# Patient Record
Sex: FEMALE | Race: WHITE | NOT HISPANIC OR LATINO | Employment: FULL TIME | URBAN - METROPOLITAN AREA
[De-identification: names, ages, dates, MRNs, and addresses within clinical notes are randomized per-mention and may not be internally consistent; named-entity substitution may affect disease eponyms.]

---

## 2017-01-20 ENCOUNTER — OFFICE VISIT (OUTPATIENT)
Dept: URGENT CARE | Facility: PHYSICIAN GROUP | Age: 57
End: 2017-01-20
Payer: COMMERCIAL

## 2017-01-20 VITALS
HEART RATE: 90 BPM | BODY MASS INDEX: 43.98 KG/M2 | DIASTOLIC BLOOD PRESSURE: 88 MMHG | OXYGEN SATURATION: 97 % | SYSTOLIC BLOOD PRESSURE: 132 MMHG | TEMPERATURE: 98.4 F | WEIGHT: 239 LBS | HEIGHT: 62 IN

## 2017-01-20 DIAGNOSIS — M54.50 ACUTE LEFT-SIDED LOW BACK PAIN WITHOUT SCIATICA: ICD-10-CM

## 2017-01-20 PROCEDURE — 99214 OFFICE O/P EST MOD 30 MIN: CPT | Performed by: FAMILY MEDICINE

## 2017-01-20 RX ORDER — NAPROXEN 500 MG/1
500 TABLET ORAL 2 TIMES DAILY WITH MEALS
Qty: 40 TAB | Refills: 0 | Status: SHIPPED | OUTPATIENT
Start: 2017-01-20

## 2017-01-20 RX ORDER — CYCLOBENZAPRINE HCL 10 MG
10 TABLET ORAL 2 TIMES DAILY PRN
Qty: 14 TAB | Refills: 0 | Status: SHIPPED | OUTPATIENT
Start: 2017-01-20

## 2017-01-20 ASSESSMENT — ENCOUNTER SYMPTOMS
NUMBNESS: 1
TINGLING: 1
DIZZINESS: 0
FEVER: 0
NAUSEA: 0
BOWEL INCONTINENCE: 0
HEARTBURN: 0
VOMITING: 0
SHORTNESS OF BREATH: 0
BACK PAIN: 1

## 2017-01-20 NOTE — MR AVS SNAPSHOT
"Daiana Palmer   2017 9:10 AM   Office Visit   MRN: [id number]    Department:  [facility]   Dept Phone:  [phone]    Description:  Female : 1960   Provider:  Rambo Kiser M.D.           Reason for Visit     Back Pain Lower back pain/mid back pain x6 days       Allergies as of 2017     Allergen Noted Reactions    Codeine 2010   Nausea      You were diagnosed with     Acute left-sided low back pain without sciatica   [[id number]]         Vital Signs     Blood Pressure Pulse Temperature Height Weight Body Mass Index    132/88 mmHg 90 36.9 °C (98.4 °F) 1.575 m (5' 2.01\") 108.41 kg (239 lb) 43.70 kg/m2    Oxygen Saturation                   97%           Basic Information     Date Of Birth Sex Race Ethnicity Preferred Language    1960 Female White Non- English      Problem List              ICD-10-CM Priority Class Noted - Resolved    Visit for screening mammogram Z12.31   10/28/2010 - Present    Allergic state T78.40XA   Unknown - Present    Arthritis M19.90   Unknown - Present    Menopausal and postmenopausal disorder N95.9   2006 - Present    Plantar fascia syndrome M72.2   Unknown - Present    Hyperlipidemia E78.5   Unknown - Present    Shingles B02.9   Unknown - Present    Vitamin d deficiency    2010 - Present      Health Maintenance        Date Due Completion Dates    IMM DTaP/Tdap/Td Vaccine (1 - Tdap) 1977 ---    MAMMOGRAM 1998 ---    COLONOSCOPY 2008 ---    PAP SMEAR 2013    IMM INFLUENZA (1) 2016 ---            Current Immunizations     No immunizations on file.      Below and/or attached are the medications your provider expects you to take. Review all of your home medications and newly ordered medications with your provider and/or pharmacist. Follow medication instructions as directed by your provider and/or pharmacist. Please keep your medication list with you and share with your provider. Update the information " when medications are discontinued, doses are changed, or new medications (including over-the-counter products) are added; and carry medication information at all times in the event of emergency situations     Allergies:  CODEINE - Nausea               Medications  Valid as of: January 20, 2017 -  9:46 AM    Generic Name Brand Name Tablet Size Instructions for use    Azithromycin (Tab) ZITHROMAX 250 MG uad        Cyclobenzaprine HCl (Tab) FLEXERIL 10 MG Take 1 Tab by mouth 2 times a day as needed. May cause drowsiness (do not operate heavy machinery).        Ibuprofen   600 mg by Does not apply route 3 times a day. Indications: Soft Tissue Inflammation in the Sole of the Foot        Naproxen (Tab) NAPROSYN 500 MG Take 1 Tab by mouth 2 times a day, with meals. Daily for 5 days, then when necessary for pain        .                 Medicines prescribed today were sent to:     [id number] - [place], [place] - [address]    [address] [place] [address]    Phone: [phone] Fax: [phone]    Open 24 Hours?: No      Medication refill instructions:       If your prescription bottle indicates you have medication refills left, it is not necessary to call your provider’s office. Please contact your pharmacy and they will refill your medication.    If your prescription bottle indicates you do not have any refills left, you may request refills at any time through one of the following ways: The online Netotiate system (except Urgent Care), by calling your provider’s office, or by asking your pharmacy to contact your provider’s office with a refill request. Medication refills are processed only during regular business hours and may not be available until the next business day. Your provider may request additional information or to have a follow-up visit with you prior to refilling your medication.   *Please Note: Medication refills are assigned a new Rx number when refilled electronically. Your pharmacy  may indicate that no refills were authorized even though a new prescription for the same medication is available at the pharmacy. Please request the medicine by name with the pharmacy before contacting your provider for a refill.        Instructions    Back Pain, Adult  Back pain is very common in adults. The cause of back pain is rarely dangerous and the pain often gets better over time. The cause of your back pain may not be known. Some common causes of back pain include:  · Strain of the muscles or ligaments supporting the spine.  · Wear and tear (degeneration) of the spinal disks.  · Arthritis.  · Direct injury to the back.  For many people, back pain may return. Since back pain is rarely dangerous, most people can learn to manage this condition on their own.  HOME CARE INSTRUCTIONS  Watch your back pain for any changes. The following actions may help to lessen any discomfort you are feeling:  · Remain active. It is stressful on your back to sit or  one place for long periods of time. Do not sit, drive, or  one place for more than 30 minutes at a time. Take short walks on even surfaces as soon as you are able. Try to increase the length of time you walk each day.  · Exercise regularly as directed by your health care provider. Exercise helps your back heal faster. It also helps avoid future injury by keeping your muscles strong and flexible.  · Do not stay in bed. Resting more than 1-2 days can delay your recovery.  · Pay attention to your body when you bend and lift. The most comfortable positions are those that put less stress on your recovering back. Always use proper lifting techniques, including:  ¨ Bending your knees.  ¨ Keeping the load close to your body.  ¨ Avoiding twisting.  · Find a comfortable position to sleep. Use a firm mattress and lie on your side with your knees slightly bent. If you lie on your back, put a pillow under your knees.  · Avoid feeling anxious or stressed. Stress  increases muscle tension and can worsen back pain. It is important to recognize when you are anxious or stressed and learn ways to manage it, such as with exercise.  · Take medicines only as directed by your health care provider. Over-the-counter medicines to reduce pain and inflammation are often the most helpful. Your health care provider may prescribe muscle relaxant drugs. These medicines help dull your pain so you can more quickly return to your normal activities and healthy exercise.  · Apply ice to the injured area:  ¨ Put ice in a plastic bag.  ¨ Place a towel between your skin and the bag.  ¨ Leave the ice on for 20 minutes, 2-3 times a day for the first 2-3 days. After that, ice and heat may be alternated to reduce pain and spasms.  · Maintain a healthy weight. Excess weight puts extra stress on your back and makes it difficult to maintain good posture.  SEEK MEDICAL CARE IF:  · You have pain that is not relieved with rest or medicine.  · You have increasing pain going down into the legs or buttocks.  · You have pain that does not improve in one week.  · You have night pain.  · You lose weight.  · You have a fever or chills.  SEEK IMMEDIATE MEDICAL CARE IF:   · You develop new bowel or bladder control problems.  · You have unusual weakness or numbness in your arms or legs.  · You develop nausea or vomiting.  · You develop abdominal pain.  · You feel faint.     This information is not intended to replace advice given to you by your health care provider. Make sure you discuss any questions you have with your health care provider.     Document Released: 12/18/2006 Document Revised: 01/08/2016 Document Reviewed: 04/21/2015  [employer] Interactive Patient Education ©2016 [employer]      Lumbosacral Strain  Lumbosacral strain is a strain of any of the parts that make up your lumbosacral vertebrae. Your lumbosacral vertebrae are the bones that make up the lower third of your backbone. Your lumbosacral vertebrae  are held together by muscles and tough, fibrous tissue (ligaments).   CAUSES   A sudden blow to your back can cause lumbosacral strain. Also, anything that causes an excessive stretch of the muscles in the low back can cause this strain. This is typically seen when people exert themselves strenuously, fall, lift heavy objects, bend, or crouch repeatedly.  RISK FACTORS  · Physically demanding work.  · Participation in pushing or pulling sports or sports that require a sudden twist of the back (tennis, golf, baseball).  · Weight lifting.  · Excessive lower back curvature.  · Forward-tilted pelvis.  · Weak back or abdominal muscles or both.  · Tight hamstrings.  SIGNS AND SYMPTOMS   Lumbosacral strain may cause pain in the area of your injury or pain that moves (radiates) down your leg.   DIAGNOSIS  Your health care provider can often diagnose lumbosacral strain through a physical exam. In some cases, you may need tests such as X-ray exams.   TREATMENT   Treatment for your lower back injury depends on many factors that your clinician will have to evaluate. However, most treatment will include the use of anti-inflammatory medicines.  HOME CARE INSTRUCTIONS   · Avoid hard physical activities (tennis, racquetball, waterskiing) if you are not in proper physical condition for it. This may aggravate or create problems.  · If you have a back problem, avoid sports requiring sudden body movements. Swimming and walking are generally safer activities.  · Maintain good posture.  · Maintain a healthy weight.  · For acute conditions, you may put ice on the injured area.  ¨ Put ice in a plastic bag.  ¨ Place a towel between your skin and the bag.  ¨ Leave the ice on for 20 minutes, 2-3 times a day.  · When the low back starts healing, stretching and strengthening exercises may be recommended.  SEEK MEDICAL CARE IF:  · Your back pain is getting worse.  · You experience severe back pain not relieved with medicines.  SEEK IMMEDIATE  MEDICAL CARE IF:   · You have numbness, tingling, weakness, or problems with the use of your arms or legs.  · There is a change in bowel or bladder control.  · You have increasing pain in any area of the body, including your belly (abdomen).  · You notice shortness of breath, dizziness, or feel faint.  · You feel sick to your stomach (nauseous), are throwing up (vomiting), or become sweaty.  · You notice discoloration of your toes or legs, or your feet get very cold.  MAKE SURE YOU:   · Understand these instructions.  · Will watch your condition.  · Will get help right away if you are not doing well or get worse.     This information is not intended to replace advice given to you by your health care provider. Make sure you discuss any questions you have with your health care provider.     Document Released: 09/27/2006 Document Revised: 01/08/2016 Document Reviewed: 08/06/2014  [employer] Interactive Patient Education ©2016 [employer].    PILATES DVD from amazon[employer]makerist. Beginner with high ratings        BASIC EXERCISES FOR THE LOW BACK  Perform these exercises slowly, without forcing movement. Be sure to breathe throughout the exercises. You should  feel a slight stretch, however, do not move into pain. Your symptoms should not intensify as a result of doing your  exercises. Perform the exercises 2-3 times daily.  Hamstrings (fig.1)  Lying on floor, pull thigh towards your chest to about 90 .  Straighten your knee until a stretch is felt in back of thigh.  Hold 1 minute. Repeat with opposite leg.    Single Knee to Chest (fig.2)  Pull knee in to chest until a comfortable stretch is felt in hip  and lower back. Hold 15 seconds. Repeat with opposite leg.  Repeat 5-10 times each leg.  Pelvic Tilt (fig.3)  Flatten back by tightening stomach and buttock muscles.  Hold 10 seconds. Repeat 10 times.  Cat and Camel (fig.4)  On all fours, assume a “hump” back position by arching the  back up. Hold briefly and then slowly lower the  back into a  sagging position. Repeat 10-15 times.  Hip Flexors(fig.5)  Lying on you back, pull one knee to the chest to keep the  back flat. Allow the opposite thigh to drop over the edge of  the bed. Do not allow the thigh to move away from the  midline or rotate. Hold 30 seconds. Repeat 2 times each leg.    Prop Up on Elbows (fig.6)    On firm surface, lying on your stomach, prop up on your  elbows. Keep pelvis, hips and legs relaxed. If propping on  elbows is painful, try only lying on stomach or with a pillow  under your abdomen. Hold 30 seconds. Repeat 3-5 times.  Tail Wag (fig.7)  On all fours with back maintained in neutral position, gently  move hips toward rib cage to side bend trunk. Hold briefly,  then alternate and do other side. Repeat 10-15 times.  Lumbar Rotation (fig.8)  Slowly rock knees from side to side in a pain free range of  motion. Allow back to rotate slightly. Repeat 10-15 times.            [employer] Access Code: [email or web address]  Expires: 1/27/2017  2:57 PM    [employer]  A secure, online tool to manage your health information     [employer]’s [employer]® is a secure, online tool that connects you to your personalized health information from the privacy of your home -- day or night - making it very easy for you to manage your healthcare. Once the activation process is completed, you can even access your medical information using the [employer] alex, which is available for free in the Apple Alex store or Google Play store.     [employer] provides the following levels of access (as shown below):   My Chart Features   [facility] Doctor [facility]  Urgent  Care Non-[facility]  Primary Care  Doctor   Email your healthcare team securely and privately 24/7 X X X    Manage appointments: schedule your next appointment; view details of past/upcoming appointments X      Request prescription refills. X      View recent personal medical records, including lab and immunizations X X X X   View  health record, including health history, allergies, medications X X X X   Read reports about your outpatient visits, procedures, consult and ER notes X X X X   See your discharge summary, which is a recap of your hospital and/or ER visit that includes your diagnosis, lab results, and care plan. X X       How to register for [employer]:  1. Go to  [email or web address].  2. Click on the Sign Up Now box, which takes you to the New Member Sign Up page. You will need to provide the following information:  a. Enter your [employer] Access Code exactly as it appears at the top of this page. (You will not need to use this code after you’ve completed the sign-up process. If you do not sign up before the expiration date, you must request a new code.)   b. Enter your date of birth.   c. Enter your home email address.   d. Click Submit, and follow the next screen’s instructions.  3. Create a [employer] Software ID. This will be your [employer] login ID and cannot be changed, so think of one that is secure and easy to remember.  4. Create a Jumbletst password. You can change your password at any time.  5. Enter your Password Reset Question and Answer. This can be used at a later time if you forget your password.   6. Enter your e-mail address. This allows you to receive e-mail notifications when new information is available in [employer].  7. Click Sign Up. You can now view your health information.    For assistance activating your [employer] account, call [phone]

## 2017-01-20 NOTE — PROGRESS NOTES
Subjective:      Daiana Palmer is a 57 y.o. female who presents with Back Pain            Back Pain  This is a new problem. The current episode started in the past 7 days (5 days). The problem has been gradually worsening since onset. The pain is present in the lumbar spine (left lumbar). The quality of the pain is described as aching. The pain radiates to the left thigh (Left radicular pain to the knee, numbness and tingling). The pain is at a severity of 5/10. The pain is moderate. The pain is worse during the night. The symptoms are aggravated by position. Associated symptoms include numbness and tingling. Pertinent negatives include no bladder incontinence, bowel incontinence, chest pain or fever. Risk factors: extensinve sitting and lifting to help mom move. She has tried NSAIDs for the symptoms. The treatment provided mild relief.       Review of Systems   Constitutional: Negative for fever.   Respiratory: Negative for shortness of breath.    Cardiovascular: Negative for chest pain.   Gastrointestinal: Negative for heartburn, nausea, vomiting and bowel incontinence.   Genitourinary: Negative for bladder incontinence.   Musculoskeletal: Positive for back pain.   Neurological: Positive for tingling and numbness. Negative for dizziness.     PMH:  has a past medical history of Menopausal and postmenopausal disorder (12/2006); Urinary tract infection, site not specified; Plantar fascia syndrome (2010); Allergy; Arthritis; Hyperlipidemia; and Shingles (8/2009). She also has no past medical history of Addisons disease (CMS-HCC), Adrenal disorder (CMS-HCC), Cushings syndrome (CMS-HCC), Parathyroid disorder (CMS-HCC), Pituitary disease (CMS-HCC), GERD (gastroesophageal reflux disease), Anemia, Blood transfusion, Anxiety, Depression, Arrhythmia, Heart murmur, Heart attack (CMS-HCC), CHF (congestive heart failure) (CMS-HCC), Clotting disorder (CMS-HCC), ASTHMA, COPD, Diabetes, Cancer (CMS-HCC), CATARACT, Glaucoma,  "Goiter, EMPHYSEMA, Headache(784.0), Migraine, HIV (human immunodeficiency virus infection), Hypertension, IBD (inflammatory bowel disease), Kidney disease, Meningitis, Muscle disorder, OSTEOPOROSIS, Seizure (CMS-[facility]), Stroke (CMS-[facility]), Substance abuse, Thyroid disease, Tuberculosis, or Ulcer (CMS-[facility]).  MEDS:   Current outpatient prescriptions:   •  azithromycin (ZITHROMAX) 250 MG Tab, uad, Disp: 6 Tab, Rfl: 0  •  IBUPROFEN, 600 mg by Does not apply route 3 times a day. Indications: Soft Tissue Inflammation in the Sole of the Foot, Disp: , Rfl:   ALLERGIES:   Allergies   Allergen Reactions   • Codeine Nausea     SURGHX:   Past Surgical History   Procedure Laterality Date   • Hernia repair       Baby     SOCHX:  reports that she has quit smoking. Her smoking use included Cigarettes. She has a 5 pack-year smoking history. She does not have any smokeless tobacco history on file. She reports that she drinks about 7.0 oz of alcohol per week. She reports that she does not use illicit drugs.  FH: Family history was reviewed, no pertinent findings to report      Objective:     /88 mmHg  Pulse 90  Temp(Src) 36.9 °C (98.4 °F)  Ht 1.575 m (5' 2.01\")  Wt 108.41 kg (239 lb)  BMI 43.70 kg/m2  SpO2 97%     Physical Exam   Constitutional: She appears well-developed. No distress.   Pulmonary/Chest: Effort normal.   Skin: Skin is warm and dry. She is not diaphoretic. No erythema.   Psychiatric: She has a normal mood and affect. Her behavior is normal.         Lumbar spine exam:  No acute distress  Able to walk on heels and toes  Able to flex to 90° with some discomfort  Extension and lateral rotation with some discomfort  Strength testing with hip flexion, knee flexion and extension, ankle dorsiflexion and plantarflexion, and EHL testing were 5 out of 5 bilaterally  Sensation was intact bilaterally  The legs were otherwise neurovascularly intact     Assessment/Plan:     1. Acute left-sided low back pain without sciatica  " naproxen (NAPROSYN) 500 MG Tab    cyclobenzaprine (FLEXERIL) 10 MG Tab       Beginner Pilates upon recovery  gentle stretching  Follow-up if symptoms worsen or fail to improve

## 2017-01-20 NOTE — PATIENT INSTRUCTIONS
Back Pain, Adult  Back pain is very common in adults. The cause of back pain is rarely dangerous and the pain often gets better over time. The cause of your back pain may not be known. Some common causes of back pain include:  · Strain of the muscles or ligaments supporting the spine.  · Wear and tear (degeneration) of the spinal disks.  · Arthritis.  · Direct injury to the back.  For many people, back pain may return. Since back pain is rarely dangerous, most people can learn to manage this condition on their own.  HOME CARE INSTRUCTIONS  Watch your back pain for any changes. The following actions may help to lessen any discomfort you are feeling:  · Remain active. It is stressful on your back to sit or  one place for long periods of time. Do not sit, drive, or  one place for more than 30 minutes at a time. Take short walks on even surfaces as soon as you are able. Try to increase the length of time you walk each day.  · Exercise regularly as directed by your health care provider. Exercise helps your back heal faster. It also helps avoid future injury by keeping your muscles strong and flexible.  · Do not stay in bed. Resting more than 1-2 days can delay your recovery.  · Pay attention to your body when you bend and lift. The most comfortable positions are those that put less stress on your recovering back. Always use proper lifting techniques, including:  ¨ Bending your knees.  ¨ Keeping the load close to your body.  ¨ Avoiding twisting.  · Find a comfortable position to sleep. Use a firm mattress and lie on your side with your knees slightly bent. If you lie on your back, put a pillow under your knees.  · Avoid feeling anxious or stressed. Stress increases muscle tension and can worsen back pain. It is important to recognize when you are anxious or stressed and learn ways to manage it, such as with exercise.  · Take medicines only as directed by your health care provider. Over-the-counter  medicines to reduce pain and inflammation are often the most helpful. Your health care provider may prescribe muscle relaxant drugs. These medicines help dull your pain so you can more quickly return to your normal activities and healthy exercise.  · Apply ice to the injured area:  ¨ Put ice in a plastic bag.  ¨ Place a towel between your skin and the bag.  ¨ Leave the ice on for 20 minutes, 2-3 times a day for the first 2-3 days. After that, ice and heat may be alternated to reduce pain and spasms.  · Maintain a healthy weight. Excess weight puts extra stress on your back and makes it difficult to maintain good posture.  SEEK MEDICAL CARE IF:  · You have pain that is not relieved with rest or medicine.  · You have increasing pain going down into the legs or buttocks.  · You have pain that does not improve in one week.  · You have night pain.  · You lose weight.  · You have a fever or chills.  SEEK IMMEDIATE MEDICAL CARE IF:   · You develop new bowel or bladder control problems.  · You have unusual weakness or numbness in your arms or legs.  · You develop nausea or vomiting.  · You develop abdominal pain.  · You feel faint.     This information is not intended to replace advice given to you by your health care provider. Make sure you discuss any questions you have with your health care provider.     Document Released: 12/18/2006 Document Revised: 01/08/2016 Document Reviewed: 04/21/2015  [employer] Interactive Patient Education ©2016 [employer]      Lumbosacral Strain  Lumbosacral strain is a strain of any of the parts that make up your lumbosacral vertebrae. Your lumbosacral vertebrae are the bones that make up the lower third of your backbone. Your lumbosacral vertebrae are held together by muscles and tough, fibrous tissue (ligaments).   CAUSES   A sudden blow to your back can cause lumbosacral strain. Also, anything that causes an excessive stretch of the muscles in the low back can cause this strain. This is  typically seen when people exert themselves strenuously, fall, lift heavy objects, bend, or crouch repeatedly.  RISK FACTORS  · Physically demanding work.  · Participation in pushing or pulling sports or sports that require a sudden twist of the back (tennis, golf, baseball).  · Weight lifting.  · Excessive lower back curvature.  · Forward-tilted pelvis.  · Weak back or abdominal muscles or both.  · Tight hamstrings.  SIGNS AND SYMPTOMS   Lumbosacral strain may cause pain in the area of your injury or pain that moves (radiates) down your leg.   DIAGNOSIS  Your health care provider can often diagnose lumbosacral strain through a physical exam. In some cases, you may need tests such as X-ray exams.   TREATMENT   Treatment for your lower back injury depends on many factors that your clinician will have to evaluate. However, most treatment will include the use of anti-inflammatory medicines.  HOME CARE INSTRUCTIONS   · Avoid hard physical activities (tennis, racquetball, waterskiing) if you are not in proper physical condition for it. This may aggravate or create problems.  · If you have a back problem, avoid sports requiring sudden body movements. Swimming and walking are generally safer activities.  · Maintain good posture.  · Maintain a healthy weight.  · For acute conditions, you may put ice on the injured area.  ¨ Put ice in a plastic bag.  ¨ Place a towel between your skin and the bag.  ¨ Leave the ice on for 20 minutes, 2-3 times a day.  · When the low back starts healing, stretching and strengthening exercises may be recommended.  SEEK MEDICAL CARE IF:  · Your back pain is getting worse.  · You experience severe back pain not relieved with medicines.  SEEK IMMEDIATE MEDICAL CARE IF:   · You have numbness, tingling, weakness, or problems with the use of your arms or legs.  · There is a change in bowel or bladder control.  · You have increasing pain in any area of the body, including your belly (abdomen).  · You  notice shortness of breath, dizziness, or feel faint.  · You feel sick to your stomach (nauseous), are throwing up (vomiting), or become sweaty.  · You notice discoloration of your toes or legs, or your feet get very cold.  MAKE SURE YOU:   · Understand these instructions.  · Will watch your condition.  · Will get help right away if you are not doing well or get worse.     This information is not intended to replace advice given to you by your health care provider. Make sure you discuss any questions you have with your health care provider.     Document Released: 09/27/2006 Document Revised: 01/08/2016 Document Reviewed: 08/06/2014  [employer] Interactive Patient Education ©2016 [employer]    PILATES DVD from [employer]. Beginner with high ratings        BASIC EXERCISES FOR THE LOW BACK  Perform these exercises slowly, without forcing movement. Be sure to breathe throughout the exercises. You should  feel a slight stretch, however, do not move into pain. Your symptoms should not intensify as a result of doing your  exercises. Perform the exercises 2-3 times daily.  Hamstrings (fig.1)  Lying on floor, pull thigh towards your chest to about 90 .  Straighten your knee until a stretch is felt in back of thigh.  Hold 1 minute. Repeat with opposite leg.    Single Knee to Chest (fig.2)  Pull knee in to chest until a comfortable stretch is felt in hip  and lower back. Hold 15 seconds. Repeat with opposite leg.  Repeat 5-10 times each leg.  Pelvic Tilt (fig.3)  Flatten back by tightening stomach and buttock muscles.  Hold 10 seconds. Repeat 10 times.  Cat and Camel (fig.4)  On all fours, assume a “hump” back position by arching the  back up. Hold briefly and then slowly lower the back into a  sagging position. Repeat 10-15 times.  Hip Flexors(fig.5)  Lying on you back, pull one knee to the chest to keep the  back flat. Allow the opposite thigh to drop over the edge of  the bed. Do not allow the thigh to move away from  the  midline or rotate. Hold 30 seconds. Repeat 2 times each leg.    Prop Up on Elbows (fig.6)    On firm surface, lying on your stomach, prop up on your  elbows. Keep pelvis, hips and legs relaxed. If propping on  elbows is painful, try only lying on stomach or with a pillow  under your abdomen. Hold 30 seconds. Repeat 3-5 times.  Tail Wag (fig.7)  On all fours with back maintained in neutral position, gently  move hips toward rib cage to side bend trunk. Hold briefly,  then alternate and do other side. Repeat 10-15 times.  Lumbar Rotation (fig.8)  Slowly rock knees from side to side in a pain free range of  motion. Allow back to rotate slightly. Repeat 10-15 times.

## 2021-02-08 ENCOUNTER — HOSPITAL ENCOUNTER (OUTPATIENT)
Facility: MEDICAL CENTER | Age: 61
End: 2021-02-08
Attending: FAMILY MEDICINE
Payer: COMMERCIAL

## 2021-02-10 LAB
FORWARD REASON: SPWHY: NORMAL
FORWARDED TO LAB: SPWHR: NORMAL
SPECIMEN SENT: SPWT1: NORMAL